# Patient Record
Sex: MALE | Race: WHITE | Employment: OTHER | URBAN - METROPOLITAN AREA
[De-identification: names, ages, dates, MRNs, and addresses within clinical notes are randomized per-mention and may not be internally consistent; named-entity substitution may affect disease eponyms.]

---

## 2016-09-19 LAB — MICROALBUMIN UR TEST STR-MCNC: NORMAL MG/DL

## 2019-03-12 LAB
CREATININE, EXTERNAL: 0.9
LDL-C, EXTERNAL: 119
PSA, EXTERNAL: 0.9

## 2020-10-20 VITALS
DIASTOLIC BLOOD PRESSURE: 81 MMHG | SYSTOLIC BLOOD PRESSURE: 143 MMHG | RESPIRATION RATE: 18 BRPM | WEIGHT: 178 LBS | HEART RATE: 71 BPM | BODY MASS INDEX: 24.11 KG/M2 | HEIGHT: 72 IN

## 2020-10-20 PROBLEM — E78.5 HYPERLIPIDEMIA: Status: ACTIVE | Noted: 2020-10-20

## 2020-10-20 PROBLEM — F41.9 ANXIETY: Status: ACTIVE | Noted: 2020-10-20

## 2020-10-20 PROBLEM — M72.0 DUPUYTREN'S DISEASE: Status: ACTIVE | Noted: 2020-10-20

## 2020-10-20 PROBLEM — R53.83 FATIGUE: Status: ACTIVE | Noted: 2020-10-20

## 2021-06-07 ENCOUNTER — HOSPITAL ENCOUNTER (OUTPATIENT)
Dept: CT IMAGING | Age: 71
Discharge: HOME | End: 2021-06-07
Payer: SELF-PAY

## 2021-06-07 DIAGNOSIS — Z13.6 SCREENING FOR CARDIOVASCULAR CONDITION: ICD-10-CM

## 2021-06-07 PROCEDURE — 75571 CT HRT W/O DYE W/CA TEST: CPT

## 2021-06-17 ENCOUNTER — TELEPHONE (OUTPATIENT)
Dept: OTHER | Age: 71
End: 2021-06-17

## 2021-06-25 ENCOUNTER — TELEPHONE (OUTPATIENT)
Dept: OTHER | Age: 71
End: 2021-06-25

## 2021-06-25 NOTE — TELEPHONE ENCOUNTER
Left message for patient, results mailed to patient with patient education and to follow up with their primary care provider.